# Patient Record
Sex: FEMALE
[De-identification: names, ages, dates, MRNs, and addresses within clinical notes are randomized per-mention and may not be internally consistent; named-entity substitution may affect disease eponyms.]

---

## 2023-11-03 PROBLEM — Z00.00 ENCOUNTER FOR PREVENTIVE HEALTH EXAMINATION: Status: ACTIVE | Noted: 2023-11-03

## 2023-12-06 ENCOUNTER — APPOINTMENT (OUTPATIENT)
Dept: VASCULAR SURGERY | Facility: CLINIC | Age: 73
End: 2023-12-06

## 2024-09-05 ENCOUNTER — EMERGENCY (EMERGENCY)
Facility: HOSPITAL | Age: 74
LOS: 1 days | Discharge: HOME | End: 2024-09-05
Attending: EMERGENCY MEDICINE | Admitting: EMERGENCY MEDICINE

## 2024-09-05 VITALS
RESPIRATION RATE: 20 BRPM | HEART RATE: 88 BPM | DIASTOLIC BLOOD PRESSURE: 109 MMHG | TEMPERATURE: 98 F | SYSTOLIC BLOOD PRESSURE: 187 MMHG | OXYGEN SATURATION: 99 %

## 2024-09-05 VITALS
WEIGHT: 250 LBS | RESPIRATION RATE: 20 BRPM | TEMPERATURE: 98 F | OXYGEN SATURATION: 97 % | DIASTOLIC BLOOD PRESSURE: 88 MMHG | HEART RATE: 93 BPM | SYSTOLIC BLOOD PRESSURE: 154 MMHG

## 2024-09-05 PROCEDURE — 99283 EMERGENCY DEPT VISIT LOW MDM: CPT | Mod: 25

## 2024-09-05 PROCEDURE — 10160 PNXR ASPIR ABSC HMTMA BULLA: CPT

## 2024-09-05 NOTE — ED ADULT NURSE NOTE - NSFALLUNIVINTERV_ED_ALL_ED
Bed/Stretcher in lowest position, wheels locked, appropriate side rails in place/Call bell, personal items and telephone in reach/Instruct patient to call for assistance before getting out of bed/chair/stretcher/Non-slip footwear applied when patient is off stretcher/[place] to call system/Physically safe environment - no spills, clutter or unnecessary equipment/Purposeful proactive rounding/Room/bathroom lighting operational, light cord in reach

## 2024-09-05 NOTE — ED PROVIDER NOTE - OBJECTIVE STATEMENT
Ms. Tahir Sepulveda is a 73yo woman with a history of DM2, HTN, HLD who presents to the ER for evaluation of a R leg wound. Patient reports she has had 2 small fluid-filled pockets in her right lower leg that popped and are now healing.  She notes 1 large fluid-filled blister on the medial right lower leg that has been present for about 1 day.  States she has mild pain in her right lower leg.  Endorsing numbness and tingling of toes, but this is chronic for her.  She denies fever, difficulty ambulation.  Further no chest pain, shortness of breath, abdominal pain, headache, weakness.  No other complaints aside from blister today. Ms. Tahir Sepluveda is a 75yo woman with a history of DM2, HTN, HLD who presents to the ER for evaluation of a R leg wound. Patient reports she has had 2 small fluid-filled pockets in her right lower leg that popped and are now healing.  She notes 1 large fluid-filled blister on the medial right lower leg that has been present for about 1 day.  States she has mild pain in her right lower leg.  Endorsing numbness and tingling of toes, but this is chronic for her.  She denies fever, difficulty ambulation.  Further no chest pain, shortness of breath, abdominal pain, headache, weakness.  No other complaints aside from blister today.    Attending/Rosa Elena: 75 yo F as described above, p/w RLE blister. Denies fever/chills, pain, discharge, recent trauma, or change in medications,.

## 2024-09-05 NOTE — ED ADULT NURSE NOTE - OBJECTIVE STATEMENT
Yes Patient received to rm 15A, A&Ox4. Pt endorsing discoloration/ swelling to BLE x 2 days. Pt denies pain, numbness, tingling, chest pain, SOB, headache, dizziness verbalized. respirations even and unlabored. Provider at bedside, no nursing interventions needed at this time. safety maintained throughout

## 2024-09-05 NOTE — ED PROVIDER NOTE - PATIENT PORTAL LINK FT
You can access the FollowMyHealth Patient Portal offered by [facility] by registering at the following website: [email or web address]. By joining [facility]’s FollowMyHealth portal, you will also be able to view your health information using other applications (apps) compatible with our system.

## 2024-09-05 NOTE — ED PROVIDER NOTE - PHYSICAL EXAMINATION
PHYSICAL EXAM  General: Well-appearing, reclining in bed comfortably, no distress.  HEENT: Normocephalic. Atraumatic. Normal icterus. MMM.  Heart: Regular rate, rhythm. Normal S1/S2. No murmurs.   Lungs: Clear to ausculation bilaterally, no wheezes, rhonchi. Normal effort.   GI: Soft, non-distended, non-tender. No guarding or rebound.   Neuro: Alert, oriented. No obvious focal deficits.   Ext: Peripheral pulses intact. There is lower extremity edema with vascular-skin changes bilaterally. R lower extremity with one 5cm fluid filled bullae. No surrounding erythema, induration, streaking. Nontender. Motor and sensation intact. PHYSICAL EXAM  General: Well-appearing, reclining in bed comfortably, no distress.  HEENT: Normocephalic. Atraumatic. Normal icterus. MMM.  Heart: Regular rate, rhythm. Normal S1/S2. No murmurs.   Lungs: Clear to ausculation bilaterally, no wheezes, rhonchi. Normal effort.   GI: Soft, non-distended, non-tender. No guarding or rebound.   Neuro: Alert, oriented. No obvious focal deficits.   Ext: Peripheral pulses intact. There is lower extremity edema with vascular-skin changes bilaterally. R lower extremity with one 5cm fluid filled bullae. No surrounding erythema, induration, streaking. Nontender. Motor and sensation intact.    Attending/Rosa Elena: Well-appearing, NAD; PERRL/EOMI, non-icterus, supple, no SANGEETA, no JVD, RRR, CTAB; Abd-soft, NT/ND, no HSM; +trace BLE edema, +hyperpigmented changes, +RLE blisters-clear fluid, no surrounding erythema or STS, +2 DP/PT, A&Ox3, nonfocal

## 2024-09-05 NOTE — ED ADULT TRIAGE NOTE - CHIEF COMPLAINT QUOTE
Patient presents to ED with discoloration and fluid-filled pockets to lower right leg for the past two days. Patient denies any pain in leg, complains of burning and numbness in toes. Patient denies any CP or SOB, respirations equal and unlabored on room air, Fingerstick: 115 pmhx: DM2, hypothyroidism, hypotension, HLD

## 2024-09-05 NOTE — ED PROVIDER NOTE - CLINICAL SUMMARY MEDICAL DECISION MAKING FREE TEXT BOX
This is a 75yo woman with a history of DM2, HTN, HLD who presents to the ER for evaluation of a R leg bullae measuring 5cm for 1 day without signs of infection.  Vitals on arrival were notable for elevated blood pressure, but not tachycardic, afebrile, normal oxygen saturations.  Patient denying any symptoms of high blood pressure.  Here for right lower extremity bullae only.  Plan will be for needle aspiration to reduce bullae.  Will dress with antibiotic ointment, nonstick bandage, paper tape, Ace wrap and compression stockings bilaterally.  Patient tolerated this procedure without complication.  Aspirated 1.5 to 2 mL of serosanguineous fluid.  Wound was dressed, showed patient and family how to do this.  She was instructed to change dressings at home once daily and to follow-up with her primary care doctor for a recheck as needed.  We also discussed with her lower extremity edema that there may be more interventions that could be helpful, but that she should talk to her primary care for this.  Patient was comfortable with this plan, no further questions or concerns.

## 2024-09-05 NOTE — ED PROVIDER NOTE - NSFOLLOWUPINSTRUCTIONS_ED_ALL_ED_FT
SUMMARY  You were seen in the ER on 9/5/24 for evaluation of a leg bullae (fluid-filled blister). We do not think you need any labs or medications. We were able to drain the blister with a needle and we placed a dressing on it. You are safe to go home and follow up with your regular doctor for a recheck. Please follow the recommendations below. Thank you for allowing us to care for you!    RECOMMENDATIONS  For Pain  - You can take Acetaminophen (Tylenol) 650mg-1000mg every 6 hours as needed (max 4000mg/day)    For Wound Care  - Change dressing daily as we showed you how  - You can use antibiotic ointment (at any pharmacy near [facility] section) if you run out   - Continue to use compression stocking  - Careful with taking bandages/tape off to prevent skin tear/rips  - Careful with ACE wrap, if too tight, you should loosen it    SCHEDULE APPOINTMENT WITH  1. Your regular doctor if you are wanting follow up or check up or for questions. It might be good to talk about lower leg swelling and if there is anything else to do for this.     RETURN TO THE ER...  For any worsening symptoms or concerns such as worsening leg pain, swelling, redness, or if you develop a fever or cannot walk. You should also return to the ER for chest pain, shortness of breath, lightheadedness, weakness, fever,  severe pain, or anything else concerning.

## 2024-09-05 NOTE — ED ADULT TRIAGE NOTE - WEIGHT METHOD
stated [At ___ Weeks Gestation] : at [unfilled] weeks gestation [[place] [de-identified] : EI help. Special Ed class preK

## 2024-09-06 NOTE — ED PROCEDURE NOTE - PROCEDURE ADDITIONAL DETAILS
Used 25G needle and 3mL syringe to aspirate at base of bullae. Bullae reduced with serosang fluid. Dressing placed. No complications.

## 2024-09-07 ENCOUNTER — EMERGENCY (EMERGENCY)
Facility: HOSPITAL | Age: 74
LOS: 1 days | Discharge: HOME | End: 2024-09-07
Attending: EMERGENCY MEDICINE | Admitting: EMERGENCY MEDICINE
Payer: MEDICARE

## 2024-09-07 VITALS
HEART RATE: 80 BPM | OXYGEN SATURATION: 100 % | TEMPERATURE: 98 F | SYSTOLIC BLOOD PRESSURE: 150 MMHG | DIASTOLIC BLOOD PRESSURE: 78 MMHG | RESPIRATION RATE: 17 BRPM

## 2024-09-07 VITALS
RESPIRATION RATE: 16 BRPM | TEMPERATURE: 98 F | DIASTOLIC BLOOD PRESSURE: 89 MMHG | SYSTOLIC BLOOD PRESSURE: 144 MMHG | HEART RATE: 94 BPM | OXYGEN SATURATION: 99 %

## 2024-09-07 PROCEDURE — 99283 EMERGENCY DEPT VISIT LOW MDM: CPT

## 2024-09-07 NOTE — ED ADULT TRIAGE NOTE - CHIEF COMPLAINT QUOTE
pt ambulatory, c/o worsening R medial ankle wound, reports coming to ED on thursday, had I&D preformed but pain has worsening and is now experiencing purulent drainage. denies fevers. past medical history: DM, HLD, HTN

## 2024-09-07 NOTE — ED PROVIDER NOTE - PATIENT PORTAL LINK FT
You can access the FollowMyHealth Patient Portal offered by [facility] by registering at the following website: [email or web address]. By joining [facility]’s FollowMyHealth portal, you will also be able to view your health information using other applications (apps) compatible with our system.

## 2024-09-07 NOTE — ED PROVIDER NOTE - OBJECTIVE STATEMENT
74-year-old female with a history of diabetes, hypertension, hyperlipidemia presented to the emergency department for evaluation of wound to right lower leg.  Patient states she was seen in the ER 2 days ago and had a blister drained, patient states wound is draining a lot of fluid.  Patient denies fevers, chills, numbness, tingling.

## 2024-09-07 NOTE — ED PROVIDER NOTE - ATTENDING APP SHARED VISIT CONTRIBUTION OF CARE
I performed a face-to-face evaluation of the patient and performed a history and physical examination. I agree with the history and physical examination. If this was a PA visit, I personally saw the patient with the PA and performed a substantive portion of the visit including all aspects of the medical decision making.    Superficial round wound to R medial leg. Hyperemic and clear discharge. No clinical e/o infection. Plan: wound care.

## 2024-09-07 NOTE — ED PROVIDER NOTE - NSFOLLOWUPINSTRUCTIONS_ED_ALL_ED_FT
Follow up with your Primary Medical Doctor in 1-2 days.  Follow up with Vascular surgery in 1-2 days see attached list.  Apply Bacitracin ointment to affected area 2 x a day x 5 days.  Return to the ER for any persistent/worsening or new symptoms redness, swelling, discharge, fevers, chills or any concerning symptoms.

## 2024-09-07 NOTE — ED PROVIDER NOTE - CLINICAL SUMMARY MEDICAL DECISION MAKING FREE TEXT BOX
74-year-old female with a history of diabetes, hypertension, hyperlipidemia presented to the emergency department for evaluation of wound to right lower leg.  Pt is well appearing, NAD, NV intact, blister s/p I&D no sign of infection, local wound care, topical antibiotic, follow up PMD and vascular.

## 2024-09-10 PROBLEM — E11.9 TYPE 2 DIABETES MELLITUS WITHOUT COMPLICATIONS: Chronic | Status: ACTIVE | Noted: 2024-09-07

## 2024-09-10 PROBLEM — I10 ESSENTIAL (PRIMARY) HYPERTENSION: Chronic | Status: ACTIVE | Noted: 2024-09-07

## 2024-09-10 PROBLEM — E78.5 HYPERLIPIDEMIA, UNSPECIFIED: Chronic | Status: ACTIVE | Noted: 2024-09-07

## 2024-09-19 ENCOUNTER — NON-APPOINTMENT (OUTPATIENT)
Age: 74
End: 2024-09-19

## 2024-09-20 ENCOUNTER — APPOINTMENT (OUTPATIENT)
Dept: VASCULAR SURGERY | Facility: CLINIC | Age: 74
End: 2024-09-20

## 2024-09-20 VITALS
BODY MASS INDEX: 39.86 KG/M2 | WEIGHT: 248 LBS | HEIGHT: 66 IN | OXYGEN SATURATION: 97 % | DIASTOLIC BLOOD PRESSURE: 84 MMHG | HEART RATE: 99 BPM | SYSTOLIC BLOOD PRESSURE: 148 MMHG

## 2024-09-20 DIAGNOSIS — E78.00 PURE HYPERCHOLESTEROLEMIA, UNSPECIFIED: ICD-10-CM

## 2024-09-20 DIAGNOSIS — Z78.9 OTHER SPECIFIED HEALTH STATUS: ICD-10-CM

## 2024-09-20 DIAGNOSIS — Z82.49 FAMILY HISTORY OF ISCHEMIC HEART DISEASE AND OTHER DISEASES OF THE CIRCULATORY SYSTEM: ICD-10-CM

## 2024-09-20 DIAGNOSIS — E06.9 THYROIDITIS, UNSPECIFIED: ICD-10-CM

## 2024-09-20 DIAGNOSIS — I10 ESSENTIAL (PRIMARY) HYPERTENSION: ICD-10-CM

## 2024-09-20 DIAGNOSIS — E11.9 TYPE 2 DIABETES MELLITUS W/OUT COMPLICATIONS: ICD-10-CM

## 2024-09-20 DIAGNOSIS — Z80.9 FAMILY HISTORY OF MALIGNANT NEOPLASM, UNSPECIFIED: ICD-10-CM

## 2024-09-20 DIAGNOSIS — Z63.4 DISAPPEARANCE AND DEATH OF FAMILY MEMBER: ICD-10-CM

## 2024-09-20 PROCEDURE — 93970 EXTREMITY STUDY: CPT

## 2024-09-20 PROCEDURE — 29580 STRAPPING UNNA BOOT: CPT | Mod: RT

## 2024-09-20 PROCEDURE — 99204 OFFICE O/P NEW MOD 45 MIN: CPT | Mod: 25

## 2024-09-20 PROCEDURE — G2211 COMPLEX E/M VISIT ADD ON: CPT

## 2024-09-20 RX ORDER — LOSARTAN POTASSIUM 100 MG/1
TABLET, FILM COATED ORAL
Refills: 0 | Status: ACTIVE | COMMUNITY

## 2024-09-20 RX ORDER — OXYBUTYNIN CHLORIDE 2.5 MG/1
TABLET ORAL
Refills: 0 | Status: ACTIVE | COMMUNITY

## 2024-09-20 RX ORDER — METFORMIN HYDROCHLORIDE 625 MG/1
TABLET ORAL
Refills: 0 | Status: ACTIVE | COMMUNITY

## 2024-09-20 RX ORDER — PIOGLITAZONE HYDROCHLORIDE 45 MG/1
TABLET ORAL
Refills: 0 | Status: ACTIVE | COMMUNITY

## 2024-09-20 RX ORDER — DOXYCLYCLINE HYCLATE 150 MG/1
TABLET, COATED ORAL
Refills: 0 | Status: ACTIVE | COMMUNITY

## 2024-09-20 RX ORDER — LEVOTHYROXINE SODIUM 0.17 MG/1
TABLET ORAL
Refills: 0 | Status: ACTIVE | COMMUNITY

## 2024-09-20 RX ORDER — TERBINAFINE HYDROCHLORIDE 1 G/100G
CREAM TOPICAL
Refills: 0 | Status: ACTIVE | COMMUNITY

## 2024-09-20 RX ORDER — PRAVASTATIN SODIUM 80 MG/1
TABLET ORAL
Refills: 0 | Status: ACTIVE | COMMUNITY

## 2024-09-20 RX ORDER — AMLODIPINE BESYLATE 5 MG/1
TABLET ORAL
Refills: 0 | Status: ACTIVE | COMMUNITY

## 2024-09-20 SDOH — SOCIAL STABILITY - SOCIAL INSECURITY: DISSAPEARANCE AND DEATH OF FAMILY MEMBER: Z63.4

## 2024-09-20 NOTE — CONSULT LETTER
[Dear  ___] : Dear  [unfilled], [Consult Letter:] : I had the pleasure of evaluating your patient, [unfilled]. [Please see my note below.] : Please see my note below. [Consult Closing:] : Thank you very much for allowing me to participate in the care of this patient.  If you have any questions, please do not hesitate to contact me. [Sincerely,] : Sincerely, [FreeTextEntry3] : Huey Tyler M.D., F.EMMA., R.P.JONATANI.  of Vascular Surgery Assistant Professor of Radiology Director of Endovascular Program/ [facility]

## 2024-09-20 NOTE — ASSESSMENT
[FreeTextEntry1] : Patient with severe deep and superficial venous insufficiency involving the greater and lesser saphenous veins bilaterally with an ablated portion of the saphenous vein in the medial proximal thigh noted.  At this point based on the fact that the patient also has severe deep venous insufficiency, recommend conservative management including compression, local wound care, elevation, and weight loss.  This was all discussed with the patient and the family in detail.  Right leg Unna boot was applied.  Follow-up in 2 weeks.  If there is no significant improvement of the ulceration, we may consider ablation of the right saphenous vein in the distal thigh and proximal calf.

## 2024-09-20 NOTE — HISTORY OF PRESENT ILLNESS
[FreeTextEntry1] : Patient is a 74-year-old female with past medical history significant for diabetes, hypertension, chronic venous insufficiency of bilateral lower extremity with prior ablation of right proximal saphenous vein presenting to us for ulceration of the right medial ankle area for the past 3 weeks.  No fevers or chills.  Patient has no history of coronary artery disease or smoking.  Patient has no complaint of rest pain or claudication of bilateral lower extremity.  Patient also complains of significant heaviness tiredness and burning of both lower extremities which improves with wearing compression stockings.

## 2024-09-20 NOTE — PHYSICAL EXAM
[JVD] : no jugular venous distention  [Normal Heart Sounds] : normal heart sounds [2+] : left 2+ [Ankle Swelling (On Exam)] : present [Ankle Swelling Bilaterally] : bilaterally  [Ankle Swelling On The Right] : mild [Varicose Veins Of Lower Extremities] : not present [] : bilaterally [Ankle Swelling On The Left] : moderate [Abdomen Masses] : No abdominal masses [Skin Ulcer] : ulcer [de-identified] : 5 x 5 cm superficial ulceration on the right medial calf/ankle area

## 2024-10-18 ENCOUNTER — APPOINTMENT (OUTPATIENT)
Dept: VASCULAR SURGERY | Facility: CLINIC | Age: 74
End: 2024-10-18
Payer: MEDICARE

## 2024-10-18 VITALS
WEIGHT: 248 LBS | DIASTOLIC BLOOD PRESSURE: 68 MMHG | HEART RATE: 88 BPM | BODY MASS INDEX: 39.86 KG/M2 | HEIGHT: 66 IN | SYSTOLIC BLOOD PRESSURE: 134 MMHG

## 2024-10-18 PROCEDURE — G2211 COMPLEX E/M VISIT ADD ON: CPT

## 2024-10-18 PROCEDURE — 99214 OFFICE O/P EST MOD 30 MIN: CPT

## 2025-01-22 ENCOUNTER — EMERGENCY (EMERGENCY)
Facility: HOSPITAL | Age: 75
LOS: 1 days | Discharge: HOME | End: 2025-01-22
Attending: EMERGENCY MEDICINE | Admitting: EMERGENCY MEDICINE
Payer: MEDICARE

## 2025-01-22 VITALS
HEART RATE: 86 BPM | SYSTOLIC BLOOD PRESSURE: 159 MMHG | TEMPERATURE: 98 F | RESPIRATION RATE: 18 BRPM | WEIGHT: 250 LBS | OXYGEN SATURATION: 95 % | DIASTOLIC BLOOD PRESSURE: 88 MMHG

## 2025-01-22 VITALS
RESPIRATION RATE: 18 BRPM | OXYGEN SATURATION: 100 % | SYSTOLIC BLOOD PRESSURE: 132 MMHG | TEMPERATURE: 98 F | HEART RATE: 68 BPM | DIASTOLIC BLOOD PRESSURE: 71 MMHG

## 2025-01-22 LAB
ALBUMIN SERPL ELPH-MCNC: 4 G/DL — SIGNIFICANT CHANGE UP (ref 3.3–5)
ALP SERPL-CCNC: 88 U/L — SIGNIFICANT CHANGE UP (ref 40–120)
ALT FLD-CCNC: 12 U/L — SIGNIFICANT CHANGE UP (ref 4–33)
ANION GAP SERPL CALC-SCNC: 11 MMOL/L — SIGNIFICANT CHANGE UP (ref 7–14)
APTT BLD: 35.3 SEC — SIGNIFICANT CHANGE UP (ref 24.5–35.6)
AST SERPL-CCNC: 19 U/L — SIGNIFICANT CHANGE UP (ref 4–32)
BASOPHILS # BLD AUTO: 0.03 K/UL — SIGNIFICANT CHANGE UP (ref 0–0.2)
BASOPHILS NFR BLD AUTO: 0.3 % — SIGNIFICANT CHANGE UP (ref 0–2)
BILIRUB SERPL-MCNC: 0.4 MG/DL — SIGNIFICANT CHANGE UP (ref 0.2–1.2)
BUN SERPL-MCNC: 15 MG/DL — SIGNIFICANT CHANGE UP (ref 7–23)
CALCIUM SERPL-MCNC: 9.2 MG/DL — SIGNIFICANT CHANGE UP (ref 8.4–10.5)
CHLORIDE SERPL-SCNC: 95 MMOL/L — LOW (ref 98–107)
CO2 SERPL-SCNC: 24 MMOL/L — SIGNIFICANT CHANGE UP (ref 22–31)
CREAT SERPL-MCNC: 0.6 MG/DL — SIGNIFICANT CHANGE UP (ref 0.5–1.3)
EGFR: 94 ML/MIN/1.73M2 — SIGNIFICANT CHANGE UP
EOSINOPHIL # BLD AUTO: 0.17 K/UL — SIGNIFICANT CHANGE UP (ref 0–0.5)
EOSINOPHIL NFR BLD AUTO: 1.8 % — SIGNIFICANT CHANGE UP (ref 0–6)
GLUCOSE SERPL-MCNC: 148 MG/DL — HIGH (ref 70–99)
HCT VFR BLD CALC: 37.7 % — SIGNIFICANT CHANGE UP (ref 34.5–45)
HGB BLD-MCNC: 12 G/DL — SIGNIFICANT CHANGE UP (ref 11.5–15.5)
IANC: 7.04 K/UL — SIGNIFICANT CHANGE UP (ref 1.8–7.4)
IMM GRANULOCYTES NFR BLD AUTO: 0.4 % — SIGNIFICANT CHANGE UP (ref 0–0.9)
INR BLD: 0.97 RATIO — SIGNIFICANT CHANGE UP (ref 0.85–1.16)
LYMPHOCYTES # BLD AUTO: 1.71 K/UL — SIGNIFICANT CHANGE UP (ref 1–3.3)
LYMPHOCYTES # BLD AUTO: 18 % — SIGNIFICANT CHANGE UP (ref 13–44)
MCHC RBC-ENTMCNC: 26.6 PG — LOW (ref 27–34)
MCHC RBC-ENTMCNC: 31.8 G/DL — LOW (ref 32–36)
MCV RBC AUTO: 83.6 FL — SIGNIFICANT CHANGE UP (ref 80–100)
MONOCYTES # BLD AUTO: 0.51 K/UL — SIGNIFICANT CHANGE UP (ref 0–0.9)
MONOCYTES NFR BLD AUTO: 5.4 % — SIGNIFICANT CHANGE UP (ref 2–14)
NEUTROPHILS # BLD AUTO: 7.04 K/UL — SIGNIFICANT CHANGE UP (ref 1.8–7.4)
NEUTROPHILS NFR BLD AUTO: 74.1 % — SIGNIFICANT CHANGE UP (ref 43–77)
NRBC # BLD: 0 /100 WBCS — SIGNIFICANT CHANGE UP (ref 0–0)
NRBC # FLD: 0 K/UL — SIGNIFICANT CHANGE UP (ref 0–0)
PLATELET # BLD AUTO: 213 K/UL — SIGNIFICANT CHANGE UP (ref 150–400)
POTASSIUM SERPL-MCNC: 4.8 MMOL/L — SIGNIFICANT CHANGE UP (ref 3.5–5.3)
POTASSIUM SERPL-SCNC: 4.8 MMOL/L — SIGNIFICANT CHANGE UP (ref 3.5–5.3)
PROT SERPL-MCNC: 7.5 G/DL — SIGNIFICANT CHANGE UP (ref 6–8.3)
PROTHROM AB SERPL-ACNC: 11.5 SEC — SIGNIFICANT CHANGE UP (ref 9.9–13.4)
RBC # BLD: 4.51 M/UL — SIGNIFICANT CHANGE UP (ref 3.8–5.2)
RBC # FLD: 14.6 % — HIGH (ref 10.3–14.5)
SODIUM SERPL-SCNC: 130 MMOL/L — LOW (ref 135–145)
WBC # BLD: 9.5 K/UL — SIGNIFICANT CHANGE UP (ref 3.8–10.5)
WBC # FLD AUTO: 9.5 K/UL — SIGNIFICANT CHANGE UP (ref 3.8–10.5)

## 2025-01-22 PROCEDURE — 72125 CT NECK SPINE W/O DYE: CPT | Mod: 26

## 2025-01-22 PROCEDURE — 99285 EMERGENCY DEPT VISIT HI MDM: CPT

## 2025-01-22 PROCEDURE — 70450 CT HEAD/BRAIN W/O DYE: CPT | Mod: 26

## 2025-01-22 PROCEDURE — 70486 CT MAXILLOFACIAL W/O DYE: CPT | Mod: 26

## 2025-01-22 NOTE — CONSULT NOTE ADULT - ASSESSMENT
74-year-old female with a past medical history of hypertension, hyperlipidemia, diabetes presented to the ED complaining having right eye swelling and pain.    # Periorbital hematoma  - 74F with above PMH presents after falling off bed, striking right eye on nightstand , reports normal vision immediately after trauma  - VA 20/40 OD (unable to attempt pinhole due to swelling), PH to 20/25 OS. color plates/EOMs/CVF full OD/OS  - IOP 17 OS, no proptosis  - Anterior exam with cataracts, DFE unremarkable  - No globe injury on exam  - CT with no retrobulbar hemorrhage or orbital wall fractures   - No acute ophthalmic intervention  - ice packs to eyelids for 20 minutes every 1-2 hours for first 24-48 hours  - HOB elevation to 30 degrees when at rest  - Follow up with Clinic in 3-4 weeks as below.    Discussed with Dr. Tolliver, ophthalmology attending.     Outpatient Follow-up: Patient should follow-up with his/her ophthalmologist or with [facility] Department of Ophthalmology within 1 week of after discharge at:    [address]  [place] [address]  [phone]

## 2025-01-22 NOTE — ED PROVIDER NOTE - PROGRESS NOTE DETAILS
LAZARUS DO:  74-year-old female status post mechanical fall pending Opto evaluation for possible retrobulbar hematoma. PA Ali: patient feeling better, patient was seen by the ophthamologist and cleared, patient had CT scans which were WNL, will d/c and recommend f/u with PMD.

## 2025-01-22 NOTE — ED PROVIDER NOTE - OBJECTIVE STATEMENT
This is a 74-year-old female with a past medical history of hypertension, hyperlipidemia, diabetes presented to the ED complaining having right eye swelling and pain.  She states that she was sleeping when she was having a dream and she rolled over and hit her right eye on the dresser she denies having any dizziness or lightheadedness or chest pain prior to the fall she denies having any chest pain or shortness of breath prior or after the fall.  She states that she was helped up by family member.  She currently admits to having a headache around the frontal temporal region of her head and admits to having some swelling around her eye and forehead.  She denies having any nausea vomiting diarrhea fever chills or bodyaches.  She states that the swelling has gotten to the point where she is only able to see from the inferior aspect of the eye.  She denies having any recent travel or sick contacts denies taking any blood thinners other than aspirin.

## 2025-01-22 NOTE — ED PROVIDER NOTE - NSFOLLOWUPINSTRUCTIONS_ED_ALL_ED_FT
Rest, drink plenty of fluids.  Advance activity as tolerated.  Continue all previously prescribed medications as directed.  Follow up with your primary care physician in 48-72 hours- bring copies of your results.  Return to the ER for worsening or persistent symptoms, and/or ANY NEW OR CONCERNING SYMPTOMS. If you have issues obtaining follow up, please call: [phone] to obtain a doctor or specialist who takes your insurance in your area.  You may call [phone] to make an appointment with the internal medicine clinic.

## 2025-01-22 NOTE — ED PROVIDER NOTE - EYE, RIGHT
large hematoma over the right eye and right forehead  with surrounding swelling.  Unable to full assess EOM due to swelling. of the ED, no pain with EOM of the eye.

## 2025-01-22 NOTE — ED ADULT NURSE NOTE - NSFALLRISKINTERV_ED_ALL_ED

## 2025-01-22 NOTE — ED ADULT TRIAGE NOTE - CHIEF COMPLAINT QUOTE
Patient accidentally rolled off the bed, fell and hit right orbital area on a wooden drawer. Denies LOC, patient takes ASA 81mg daily. PHx- HTN, Hypothyroidism, DM2 no insulin pump, HLD, Hypothyroidism. Patient noted with swelling and ecchymosis to right eye, eye is fully closed. Breathing non-labored.

## 2025-01-22 NOTE — ED ADULT NURSE NOTE - OBJECTIVE STATEMENT
Pt received to 22A. Pt is a 74 year old female with Hx of HTN, DM II, Hypothyroidism, HLD. Pt presented to ED states she was sleeping when she rolled off the bed and hit her eye on bedside wooden table this morning. Denies any LOC. R eye swollen shut, ecchymotic. Pt states she is not able to see anything from the R eye. Similar episode in Aug 24. denies chest pain, SOB, headache, dizziness, abdominal pain, n/v/d, urinary symptoms, fevers/chills, numbness/tingling.   Pt is A&Ox4, ambulatory without assistance. neuro/sensory intact. airway patent, speaking clearly in full sentences. breathing is even and unlabored. abdomen is soft, nontender, nondistended. no edema noted. skin is intact. spontaneous movement of all extremities. Placed on cardiac monitor and continuous pulse oximetry. EKG in chart. VS as noted in flowsheet. __ IV placed, +blood return, flushes without difficulty. labs collected and sent. medications administered as ordered. awaiting imaging. comfort measures provided. stretcher set in lowest position, call bell within reach, safety maintained. Break RN: Pt received to 22A. Pt is a 74 year old female with Hx of HTN, DM II, Hypothyroidism, HLD. Pt presented to ED states she was sleeping when she rolled off the bed and hit her eye on bedside wooden table this morning. Denies any LOC, dizziness, or forgetfulness. R eye swollen shut, ecchymotic. Pt states she is not able to see anything from the R eye. Similar episode in Aug 24. Pt denies chest pain, SOB, dizziness, abdominal pain, n/v/d, urinary symptoms, fevers/chills. Pt is A&Ox4, ambulatory with cane at baseline. neuro/sensory intact. airway patent, speaking clearly in full sentences. breathing is even and unlabored. abdomen is soft, nontender, nondistended. no edema noted. skin is intact. spontaneous movement of all extremities. Awaiting MD evaluation. comfort measures provided. stretcher set in lowest position, call bell within reach, safety maintained.

## 2025-01-22 NOTE — CONSULT NOTE ADULT - SUBJECTIVE AND OBJECTIVE BOX
[facility] DEPARTMENT OF OPHTHALMOLOGY - INITIAL ADULT CONSULT  ----------------------------------------------------------------------------------------------------  Brennen Hutchinson PGY-3  Available on teams  ----------------------------------------------------------------------------------------------------    HPI: 74-year-old female with a past medical history of hypertension, hyperlipidemia, diabetes presented to the ED complaining having right eye swelling and pain.  She states that she was sleeping when she was having a dream and she rolled over and hit her right eye on the dresser she denies having any dizziness or lightheadedness or chest pain prior to the fall she denies having any chest pain or shortness of breath prior or after the fall.  She states that she was helped up by family member.  She currently admits to having a headache around the frontal temporal region of her head and admits to having some swelling around her eye and forehead.  She denies having any nausea vomiting diarrhea fever chills or bodyaches.  She states that the swelling has gotten to the point where she is only able to see from the inferior aspect of the eye.  She denies having any recent travel or sick contacts denies taking any blood thinners other than aspirin.    Interval History: Ophthalmology consulted for periorbital hematoma. Pt woke up and fell, hitting right eye on nightstand prior to falling to the ground from her bed. Reports her vision felt normal upon waking, but now eye is swollen shut occurred over the course of 1 hour)     PAST MEDICAL & SURGICAL HISTORY:  DM (diabetes mellitus)      HTN (hypertension)      HLD (hyperlipidemia)      No significant past surgical history        Past Ocular History: None  Ophthalmic Medications: None  FAMILY HISTORY:      MEDICATIONS  (STANDING):    MEDICATIONS  (PRN):    Allergies & Intolerances:   No Known Allergies    Review of Systems:  Constitutional: No fever, chills  Eyes: See HPI   Neuro: No tremors  Cardiovascular: No chest pain, palpitations  Respiratory: No SOB, no cough  GI: No nausea, vomiting, abdominal pain  : No dysuria  Skin: no rash  Psych: no depression  Endocrine: no polyuria, polydipsia  Heme/lymph: no swelling    VITALS: T(C): 36.9 (01-22-25 @ 14:47)  T(F): 98.4 (01-22-25 @ 14:47), Max: 98.4 (01-22-25 @ 14:47)  HR: 68 (01-22-25 @ 14:47) (68 - 86)  BP: 132/71 (01-22-25 @ 14:47) (132/71 - 159/88)  RR:  (18 - 18)  SpO2:  (95% - 100%)  Wt(kg): --  General: AAO x 3, appropriate mood and affect    Ophthalmology Exam:  Visual acuity:  OD (sc) 20/40 ; OS: 20/50 PH to 20/25   Pupils: PERRL OU, no APD  Ttono: 17 OD 13 OS   Extraocular movements (EOMs): Full OU, no pain, no diplopia  Confrontational Visual Field (CVF): Full OU  Color Plates: 12/12 OU    Pen Light Exam (PLE)  External: OD: periorbital hematoma ; Flat OS  Lids/Lashes/Lacrimal Ducts: Flat OU    Sclera/Conjunctiva: W+Q OU  Cornea: Cl OU  Anterior Chamber: D+F OU    Iris: Flat OU  Lens: OD: 2+ NS, 1+ CC ; OS: 2+ NS, 2-3+ CC    Fundus Exam: dilated with 1% tropicamide and 2.5% phenylephrine  Approval obtained from primary team for dilation  Patient aware that pupils can remained dilated for at least 4-6 hours  Exam performed with 20D lens    Vitreous: wnl OU  Disc, cup/disc: sharp and pink, 0.3 OU  Macula: wnl OU  Vessels: wnl OU  Periphery: wnl OU    Labs/Imaging:  CT MAXILLOFACIAL   ORDERED BY: ANUEL HANKS     ACC: [id number] EXAM:  CT CERVICAL SPINE   ORDERED BY: ANUEL HANKS     ACC: [id number] EXAM:  CT BRAIN   ORDERED BY: ANUEL HANKS     PROCEDURE DATE:  01/22/2025    IMPRESSION:    CT HEAD:  Right anterior scalp soft tissue swelling and hematoma.  No acute intracranial hemorrhage, mass effect, or midline shift.    CT MAXILLOFACIAL:  No acute fracture.    CT CERVICAL SPINE:  No acute fracture or traumatic subluxation.    Multi-level degenerative changes.

## 2025-01-22 NOTE — ED PROVIDER NOTE - NSFOLLOWUPCLINICS_GEN_ALL_ED_FT
[facility] Ophthalmology  Ophthalmology  [address] [place] [address], [address]  [place] [address]  Phone: [phone]  Fax:

## 2025-01-22 NOTE — ED PROVIDER NOTE - PATIENT PORTAL LINK FT
You can access the FollowMyHealth Patient Portal offered by [facility] by registering at the following website: [email or web address]. By joining [facility]’s FollowMyHealth portal, you will also be able to view your health information using other applications (apps) compatible with our system.

## 2025-01-22 NOTE — ED PROVIDER NOTE - CLINICAL SUMMARY MEDICAL DECISION MAKING FREE TEXT BOX
This is a 74-year-old female with a past medical history of hypertension, hyperlipidemia, diabetes presented to the ED complaining having right eye swelling and pain. Right eye swelling- will do labs, ct maxillo facial and contact opthamology This is a 74-year-old female with a past medical history of hypertension, hyperlipidemia, diabetes presented to the ED complaining having right eye swelling and pain. Right eye swelling- will do labs, ct maxillo facial and contact opthalmology Elvia att: 74-year-old female with a past medical history of hypertension, hyperlipidemia, diabetes presented to the ED complaining having right eye swelling and pain. Right eye swelling- will do labs, ct maxillofacial to r/o retroorbital hematoma and contact opthalmology

## 2025-03-06 ENCOUNTER — NON-APPOINTMENT (OUTPATIENT)
Age: 75
End: 2025-03-06

## 2025-03-06 ENCOUNTER — APPOINTMENT (OUTPATIENT)
Dept: OPHTHALMOLOGY | Facility: CLINIC | Age: 75
End: 2025-03-06
Payer: MEDICARE

## 2025-03-06 PROCEDURE — 92134 CPTRZ OPH DX IMG PST SGM RTA: CPT

## 2025-03-06 PROCEDURE — 92004 COMPRE OPH EXAM NEW PT 1/>: CPT | Mod: 25
